# Patient Record
Sex: FEMALE | Race: WHITE
[De-identification: names, ages, dates, MRNs, and addresses within clinical notes are randomized per-mention and may not be internally consistent; named-entity substitution may affect disease eponyms.]

---

## 2019-02-13 ENCOUNTER — HOSPITAL ENCOUNTER (EMERGENCY)
Dept: HOSPITAL 74 - JER | Age: 24
Discharge: HOME | End: 2019-02-13
Payer: COMMERCIAL

## 2019-02-13 VITALS — BODY MASS INDEX: 22.1 KG/M2

## 2019-02-13 VITALS — DIASTOLIC BLOOD PRESSURE: 60 MMHG | TEMPERATURE: 98.6 F | SYSTOLIC BLOOD PRESSURE: 112 MMHG | HEART RATE: 88 BPM

## 2019-02-13 DIAGNOSIS — N83.201: ICD-10-CM

## 2019-02-13 DIAGNOSIS — R10.2: Primary | ICD-10-CM

## 2019-02-13 LAB
APPEARANCE UR: CLEAR
BILIRUB UR STRIP.AUTO-MCNC: NEGATIVE MG/DL
COLOR UR: YELLOW
KETONES UR QL STRIP: NEGATIVE
LEUKOCYTE ESTERASE UR QL STRIP.AUTO: NEGATIVE
NITRITE UR QL STRIP: NEGATIVE
PH UR: 6 [PH] (ref 5–8)
PROT UR QL STRIP: NEGATIVE
PROT UR QL STRIP: NEGATIVE
SP GR UR: 1.02 (ref 1.01–1.03)
UROBILINOGEN UR STRIP-MCNC: NEGATIVE MG/DL (ref 0.2–1)

## 2019-02-13 NOTE — PDOC
*Physical Exam





- Vital Signs


 Last Vital Signs











Temp Pulse Resp BP Pulse Ox


 


 98.6 F   88   18   112/60   100 


 


 02/13/19 18:44  02/13/19 18:44  02/13/19 18:44  02/13/19 18:44  02/13/19 18:44














ED Treatment Course





- ADDITIONAL ORDERS


Additional order review: 


 Laboratory  Results











  02/13/19





  18:52


 


Urine Color  Yellow


 


Urine Appearance  Clear


 


Urine pH  6.0


 


Ur Specific Gravity  1.019


 


Urine Protein  Negative


 


Urine Glucose (UA)  Negative


 


Urine Ketones  Negative


 


Urine Blood  Negative


 


Urine Nitrite  Negative


 


Urine Bilirubin  Negative


 


Urine Urobilinogen  Negative


 


Ur Leukocyte Esterase  Negative


 


Urine HCG, Qual  Negative














Medical Decision Making





- Medical Decision Making





02/13/19 19:30


Patient seen by the advanced practice provider under my direct supervision. 

Ancillary testing reviewed as necessary.


I agree with plan as outlined by the advanced practice provider.





*DC/Admit/Observation/Transfer


Diagnosis at time of Disposition: 


 Pelvic pain








- Discharge Dispostion


Disposition: HOME





- Referrals





- Patient Instructions





- Post Discharge Activity

## 2019-02-13 NOTE — PDOC
Rapid Medical Evaluation


Time Seen by Provider: 02/13/19 18:42


Medical Evaluation: 





02/13/19 18:42


I have performed a brief in-person evaluation of this patient.





The patient presents with a chief complaint of:pelvic pain x 1 month





Pertinent physical exam findings:NAD





I have ordered the following:U preg





The patient will proceed to the ED for further evaluation.





**Discharge Disposition





- Diagnosis


 Pelvic pain








- Referrals





- Patient Instructions





- Post Discharge Activity

## 2019-02-13 NOTE — PDOC
History of Present Illness





- General


Chief Complaint: Pain


Stated Complaint: LOWER ABD PAIN


Time Seen by Provider: 02/13/19 18:42


History Source: Patient





- History of Present Illness


Initial Comments: 





02/13/19 20:37


23 year old female c/o pelvic pain x 1 month last night with b/l pelvic pain. 

LMP: june 2018  last depo shot 8/2018. no urinary symptoms , vaginal discharge, 

no sti exposure. 





no pmhx





Past History





- Past Medical History


Allergies/Adverse Reactions: 


 Allergies











Allergy/AdvReac Type Severity Reaction Status Date / Time


 


No Known Allergies Allergy   Verified 02/13/19 18:46











COPD: No





- Suicide/Smoking/Psychosocial Hx


Smoking History: Never smoked


Information on smoking cessation initiated: No


Hx Alcohol Use: No


Drug/Substance Use Hx: No





**Review of Systems





- Review of Systems


Able to Perform ROS?: Yes


Is the patient limited English proficient: No


Constitutional: No: Symptoms Reported, See HPI, Chills, Diaphoresis, Fever, 

Loss of Appetite, Malaise, Night Sweats, Weakness, Weight Stable, Unintentional 

Wgt. Loss, Unexplained wgt Loss, Other


: Yes: Other (pelvic pain).  No: Symptoms Reported, See HPI, Burning, Dysuria

, Discharge, Frequency, Flank Pain, Hematuria, Incontinence, Pain, Urgency, 

Testicular Mass, Testicular Swelling, Lesions, Testicular Pain





*Physical Exam





- Vital Signs


 Last Vital Signs











Temp Pulse Resp BP Pulse Ox


 


 98.6 F   88   18   112/60   100 


 


 02/13/19 18:44  02/13/19 18:44  02/13/19 18:44  02/13/19 18:44  02/13/19 18:44














- Physical Exam


General Appearance: Yes: Appropriately Dressed


Respiratory/Chest: positive: Lungs Clear, Normal Breath Sounds


Cardiovascular: positive: Regular Rhythm, Regular Rate


Female Pelvic Exam: positive: normal external exam, adnexal tenderness (b/l)


Gastrointestinal/Abdominal: positive: Normal Bowel Sounds, Soft.  negative: 

Tender


Extremity: positive: Normal Capillary Refill, Normal Inspection, Normal Range 

of Motion


Integumentary: positive: Normal Color, Dry, Warm


Neurologic: positive: Fully Oriented, Alert, Normal Mood/Affect





Moderate Sedation





- Procedure Monitoring


Vital Signs: 


Procedure Monitoring Vital Signs











Temperature  98.6 F   02/13/19 18:44


 


Pulse Rate  88   02/13/19 18:44


 


Respiratory Rate  18   02/13/19 18:44


 


Blood Pressure  112/60   02/13/19 18:44


 


O2 Sat by Pulse Oximetry (%)  100   02/13/19 18:44











ED Treatment Course





- ADDITIONAL ORDERS


Additional order review: 


 Laboratory  Results











  02/13/19





  18:52


 


Urine Color  Yellow


 


Urine Appearance  Clear


 


Urine pH  6.0


 


Ur Specific Gravity  1.019


 


Urine Protein  Negative


 


Urine Glucose (UA)  Negative


 


Urine Ketones  Negative


 


Urine Blood  Negative


 


Urine Nitrite  Negative


 


Urine Bilirubin  Negative


 


Urine Urobilinogen  Negative


 


Ur Leukocyte Esterase  Negative


 


Urine HCG, Qual  Negative














*DC/Admit/Observation/Transfer


Diagnosis at time of Disposition: 


 Pelvic pain





Ovarian cyst


Qualifiers:


 Laterality: bilateral Qualified Code(s): N83.201 - Unspecified ovarian cyst, 

right side; N83.202 - Unspecified ovarian cyst, left side








- Discharge Dispostion


Disposition: HOME





- Referrals


Referrals: 


Sandra Simeon MD [Staff Physician] - 





- Patient Instructions


Printed Discharge Instructions:  Ovarian Cyst


Additional Instructions: 


take ibuprofen every 6 hours AS NEEDED  for pain





follow up with a gynecologist as soon as possible. 








Additional Instructions:


* Please call your personal physician to report your Emergency Department visit 

and to report your progress, if any.


* If there is no improvement in symptoms in 2 days call your physician.


* Return to the Emergency Department for any worsening symptoms.








- Post Discharge Activity


Forms/Work/School Notes:  Back to Work

## 2019-06-19 ENCOUNTER — HOSPITAL ENCOUNTER (EMERGENCY)
Dept: HOSPITAL 74 - JER | Age: 24
Discharge: HOME | End: 2019-06-19
Payer: SELF-PAY

## 2019-06-19 VITALS — BODY MASS INDEX: 21.2 KG/M2

## 2019-06-19 VITALS — HEART RATE: 79 BPM | SYSTOLIC BLOOD PRESSURE: 117 MMHG | DIASTOLIC BLOOD PRESSURE: 66 MMHG | TEMPERATURE: 98.4 F

## 2019-06-19 DIAGNOSIS — R10.2: Primary | ICD-10-CM

## 2019-06-19 LAB
ALBUMIN SERPL-MCNC: 4.3 G/DL (ref 3.4–5)
ALP SERPL-CCNC: 63 U/L (ref 45–117)
ALT SERPL-CCNC: 16 U/L (ref 13–61)
ANION GAP SERPL CALC-SCNC: 8 MMOL/L (ref 8–16)
APPEARANCE UR: CLEAR
AST SERPL-CCNC: 11 U/L (ref 15–37)
BASOPHILS # BLD: 0.6 % (ref 0–2)
BILIRUB SERPL-MCNC: 0.4 MG/DL (ref 0.2–1)
BILIRUB UR STRIP.AUTO-MCNC: NEGATIVE MG/DL
BUN SERPL-MCNC: 8 MG/DL (ref 7–18)
CALCIUM SERPL-MCNC: 9.2 MG/DL (ref 8.5–10.1)
CHLORIDE SERPL-SCNC: 105 MMOL/L (ref 98–107)
CO2 SERPL-SCNC: 27 MMOL/L (ref 21–32)
COLOR UR: YELLOW
CREAT SERPL-MCNC: 0.6 MG/DL (ref 0.55–1.3)
DEPRECATED RDW RBC AUTO: 12.6 % (ref 11.6–15.6)
EOSINOPHIL # BLD: 3.8 % (ref 0–4.5)
GLUCOSE SERPL-MCNC: 79 MG/DL (ref 74–106)
HCT VFR BLD CALC: 38 % (ref 32.4–45.2)
HGB BLD-MCNC: 12.7 GM/DL (ref 10.7–15.3)
KETONES UR QL STRIP: NEGATIVE
LEUKOCYTE ESTERASE UR QL STRIP.AUTO: NEGATIVE
LYMPHOCYTES # BLD: 20.6 % (ref 8–40)
MCH RBC QN AUTO: 31.9 PG (ref 25.7–33.7)
MCHC RBC AUTO-ENTMCNC: 33.4 G/DL (ref 32–36)
MCV RBC: 95.6 FL (ref 80–96)
MONOCYTES # BLD AUTO: 6.9 % (ref 3.8–10.2)
NEUTROPHILS # BLD: 68.1 % (ref 42.8–82.8)
NITRITE UR QL STRIP: NEGATIVE
PH UR: 6.5 [PH] (ref 5–8)
PLATELET # BLD AUTO: 256 K/MM3 (ref 134–434)
PLATELET BLD QL SMEAR: NORMAL
PMV BLD: 10.4 FL (ref 7.5–11.1)
POTASSIUM SERPLBLD-SCNC: 3.9 MMOL/L (ref 3.5–5.1)
PROT SERPL-MCNC: 7.5 G/DL (ref 6.4–8.2)
PROT UR QL STRIP: NEGATIVE
PROT UR QL STRIP: NEGATIVE
RBC # BLD AUTO: 3.98 M/MM3 (ref 3.6–5.2)
SODIUM SERPL-SCNC: 140 MMOL/L (ref 136–145)
SP GR UR: 1.01 (ref 1.01–1.03)
UROBILINOGEN UR STRIP-MCNC: 0.2 MG/DL (ref 0.2–1)
WBC # BLD AUTO: 10.9 K/MM3 (ref 4–10)

## 2019-06-19 NOTE — PDOC
History of Present Illness





- General


Chief Complaint: Pain, Acute


Stated Complaint: ABD PAIN


History Source: Patient





- History of Present Illness


Timing/Duration: reports: other


Abdominal Pain Onset Location: reports: suprapubic





Past History





- Past Medical History


Allergies/Adverse Reactions: 


 Allergies











Allergy/AdvReac Type Severity Reaction Status Date / Time


 


No Known Allergies Allergy   Verified 02/13/19 18:46











COPD: No





- Immunization History


Immunization Up to Date: No





- Suicide/Smoking/Psychosocial Hx


Smoking History: Never smoked


Have you smoked in the past 12 months: No


Information on smoking cessation initiated: No


Hx Alcohol Use: No


Drug/Substance Use Hx: No





**Review of Systems





- Review of Systems


Constitutional: No: Chills, Fever


ABD/GI: Yes: Abdominal cramping.  No: Blood Streaked Bowels, Constipated, 

Diarrhea, Nausea, Rectal Bleeding, Vomiting


: No: Burning, Dysuria, Flank Pain, Hematuria





*Physical Exam





- Vital Signs


 Last Vital Signs











Temp Pulse Resp BP Pulse Ox


 


 98.4 F   79   18   117/66   99 


 


 06/19/19 13:03  06/19/19 13:03  06/19/19 13:03  06/19/19 13:03  06/19/19 13:03














- Physical Exam


General Appearance: Yes: Appropriately Dressed.  No: Apparent Distress


HEENT: positive: Normal Voice


Neck: positive: Supple


Respiratory/Chest: negative: Respiratory Distress


Female Pelvic Exam: positive: cervical os closed, normal adnexa.  negative: CMT

, discharge, adnexal tenderness, vaginal bleeding


Gastrointestinal/Abdominal: positive: Normal Bowel Sounds, Tender (to 

suprapubic diffusely, no sig ttp to RLQ), Soft.  negative: Distended, Guarding, 

Rebound


Musculoskeletal: negative: CVA Tenderness


Integumentary: positive: Dry, Warm


Neurologic: positive: Fully Oriented, Alert, Normal Mood/Affect





ED Treatment Course





- LABORATORY


CBC & Chemistry Diagram: 


 06/19/19 14:00





 06/19/19 15:18





Medical Decision Making





- Medical Decision Making





06/19/19 13:42


25 yo F, no sig hx, p/w lower abd pain x 2-3 days, sharp, worse at nights, 

prevents her from sleeping.  No dysuria, hematuria, vaginal discharge, f/c or 

change in BM. No menses since came off depo 8/18 and only "spots" on and off. 

No new sexual partners. No h/o STDs





See exam





Pelvic pain


R/o preg vs torsion vs appy, less likely uti/pyelo, unlikely PID as pelvic exam 

wnl


-pain control


-labs


-US


-?CT











06/19/19 17:35





Labs/US w/ no findings to explain pain.  States pain since improved with meds.  

I explained to patient that based on the workup, that we do not know the source 

of her pain, but that we can do a CAT scan today to evaluate her appendix.  

Patient states she is feeling better and declines CT at this time, states she 

would rather go home and come back if her pain returns. Given no sig ttp to RLQ 

on exam and normal labs, shared decision making was made for pt to return to ED 

as needed











*DC/Admit/Observation/Transfer


Diagnosis at time of Disposition: 


 Pelvic pain








- Discharge Dispostion


Disposition: HOME


Condition at time of disposition: Improved





- Referrals





- Patient Instructions


Printed Discharge Instructions:  DI for Pelvic Pain


Additional Instructions: 


The source of your pain is unclear at this time as your labs, urine and 

ultrasound were all normal.


If your pain returns and is especially localized to your right lower abdomen, 

please return to ER for a CAT scan to evaluate for possible appendicitis





- Post Discharge Activity

## 2019-07-14 ENCOUNTER — HOSPITAL ENCOUNTER (EMERGENCY)
Dept: HOSPITAL 74 - JER | Age: 24
Discharge: LEFT BEFORE DISCHARGE | End: 2019-07-14
Payer: COMMERCIAL

## 2019-07-14 VITALS — SYSTOLIC BLOOD PRESSURE: 119 MMHG | HEART RATE: 93 BPM | DIASTOLIC BLOOD PRESSURE: 67 MMHG | TEMPERATURE: 98.7 F

## 2019-07-14 VITALS — BODY MASS INDEX: 20.8 KG/M2

## 2019-07-14 DIAGNOSIS — R10.2: Primary | ICD-10-CM

## 2019-07-14 LAB
APPEARANCE UR: CLEAR
BILIRUB UR STRIP.AUTO-MCNC: NEGATIVE MG/DL
COLOR UR: YELLOW
KETONES UR QL STRIP: NEGATIVE
LEUKOCYTE ESTERASE UR QL STRIP.AUTO: NEGATIVE
NITRITE UR QL STRIP: NEGATIVE
PH UR: >= 9 [PH] (ref 5–8)
PROT UR QL STRIP: NEGATIVE
PROT UR QL STRIP: NEGATIVE
SP GR UR: 1.03 (ref 1.01–1.03)
UROBILINOGEN UR STRIP-MCNC: 1 MG/DL (ref 0.2–1)

## 2019-07-14 NOTE — PDOC
History of Present Illness





- General


Chief Complaint: Pain


Stated Complaint: PELVIC PAIN


Time Seen by Provider: 07/14/19 18:24


History Source: Patient


Exam Limitations: No Limitations





- History of Present Illness


Initial Comments: 





07/14/19 18:45


Patient is a 24 year old female with no pmhx c/o pelvic pain x 3 days.  Paitent 

states the pain is 8/10, pressure, assoc/w nausea, vomiting intermittently with 

no aggravating or alleviating factors.  States she had been drinking water for 

her symptoms but has not taken any meds.  Denies dysuria, or hematuria.  No h/o 

STD.  States prior episode of this pain 4 months ago and was diagnoses with 

cyst.  Review of the record shows patient was her for these symtpoms 3 months 

ago work up neg.  lmp 6/23/19.








GYN:  Dr. Horton


PMHX:  as above


PSOCHX:  neg etoh, cig, drug








GENERAL/CONSTITUTIONAL: No fever or chills. No weakness. No weight change.


HEAD, EYES, EARS, NOSE AND THROAT: No change in vision. No ear pain or 

discharge. No sore throat.


CARDIOVASCULAR: No chest pain or shortness of breath.


RESPIRATORY: No cough, wheezing, or hemoptysis.


GASTROINTESTINAL: No nausea, vomiting, diarrhea or constipation. No rectal 

bleeding.


GENITOURINARY: No dysuria, frequency, or change in urination.


MUSCULOSKELETAL: No joint or muscle swelling or pain. No neck or back pain.


SKIN AND BREASTS: No rash or easy bruising.


NEUROLOGIC: No headache, vertigo, loss of consciousness, or loss of sensation.


PSYCHIATRIC: No depression or anxiety.


ENDOCRINE: No increased thirst. No abnormal weight change.


HEMATOLOGIC/LYMPHATIC: No anemia, easy bleeding, or history of blood clots.


ALLERGIC/IMMUNOLOGIC: No hives or skin allergy. No latex allergy.





GENERAL: The patient is awake, alert, and fully oriented, in no acute distress.


HEAD: Normal with no signs of trauma.


EYES: Pupils equal, round and reactive to light, extraocular movements intact, 

sclera anicteric, conjunctiva clear.


ENT: Ears normal, nares patent, oropharynx clear without exudates.  Moist 

mucous membranes.


NECK: Normal range of motion, supple without lymphadenopathy, JVD, or masses.


LUNGS: Breath sounds equal, clear to auscultation bilaterally.  No wheezes, and 

no crackles.


HEART: Regular rate and rhythm, normal S1 and S2 without murmur, rub.


ABDOMEN: Soft, nontender, normoactive bowel sounds.  No guarding, no rebound.  

No masses.


PELVIC:  not done patient walked out


EXTREMITIES: Normal range of motion, no edema.  No clubbing or cyanosis. No 

cords, erythema, or tenderness.


NEUROLOGICAL: Cranial nerves II through XII grossly intact.  Normal speech, 

normal gait.


PSYCH: Normal mood, normal affect.











Past History





- Past Medical History


Allergies/Adverse Reactions: 


 Allergies











Allergy/AdvReac Type Severity Reaction Status Date / Time


 


No Known Allergies Allergy   Verified 07/14/19 18:03











COPD: No





- Immunization History


Immunization Up to Date: No





- Suicide/Smoking/Psychosocial Hx


Smoking History: Never smoked


Have you smoked in the past 12 months: No


Hx Alcohol Use: No


Drug/Substance Use Hx: No





*Physical Exam





- Vital Signs


 Last Vital Signs











Temp Pulse Resp BP Pulse Ox


 


 98.7 F   93 H  18   119/67   99 


 


 07/14/19 18:01  07/14/19 18:01  07/14/19 18:01  07/14/19 18:01  07/14/19 18:01














Medical Decision Making





- Medical Decision Making





07/14/19 18:45


Patient is a 24 year old female with no pmhx c/o pelvic pain x 3 days.  Paitent 

states the pain is 8/10, pressure, assoc/w nausea, vomiting intermittently with 

no aggravating or alleviating factors.  States she had been drinking water for 

her symptoms but has not taken any meds.  Denies dysuria, or hematuria.  No h/o 

STD.  States prior episode of this pain 4 months ago and was diagnoses with 

cyst.  Review of the record shows patient was her for these symtpoms 3 months 

ago work up neg.  lmp 6/23/19.





UA/cult/preg


reassess





 


07/14/19 21:27


Urine pregnancy test negative.





Look for patient in the vertical area to do pelvic exam and disposition, 

assumed to have eloped





*DC/Admit/Observation/Transfer


Diagnosis at time of Disposition: 


 Pelvic pain








- Discharge Dispostion


Disposition: ELOPED


Condition at time of disposition: Stable





- Referrals





- Patient Instructions





- Post Discharge Activity

## 2019-09-05 ENCOUNTER — HOSPITAL ENCOUNTER (EMERGENCY)
Dept: HOSPITAL 74 - JER | Age: 24
LOS: 1 days | Discharge: HOME | End: 2019-09-06
Payer: SELF-PAY

## 2019-09-05 VITALS — SYSTOLIC BLOOD PRESSURE: 112 MMHG | TEMPERATURE: 97.7 F | DIASTOLIC BLOOD PRESSURE: 73 MMHG | HEART RATE: 91 BPM

## 2019-09-05 VITALS — BODY MASS INDEX: 22.6 KG/M2

## 2019-09-05 DIAGNOSIS — R10.2: ICD-10-CM

## 2019-09-05 DIAGNOSIS — Z3A.01: ICD-10-CM

## 2019-09-05 DIAGNOSIS — O26.891: Primary | ICD-10-CM

## 2019-09-05 LAB
ANION GAP SERPL CALC-SCNC: 6 MMOL/L (ref 8–16)
APPEARANCE UR: CLEAR
BASOPHILS # BLD: 0.4 % (ref 0–2)
BILIRUB UR STRIP.AUTO-MCNC: NEGATIVE MG/DL
BUN SERPL-MCNC: 13.9 MG/DL (ref 7–18)
CALCIUM SERPL-MCNC: 9.1 MG/DL (ref 8.5–10.1)
CHLORIDE SERPL-SCNC: 106 MMOL/L (ref 98–107)
CO2 SERPL-SCNC: 27 MMOL/L (ref 21–32)
COLOR UR: YELLOW
CREAT SERPL-MCNC: 0.6 MG/DL (ref 0.55–1.3)
DEPRECATED RDW RBC AUTO: 12.5 % (ref 11.6–15.6)
EOSINOPHIL # BLD: 3.3 % (ref 0–4.5)
GLUCOSE SERPL-MCNC: 87 MG/DL (ref 74–106)
HCT VFR BLD CALC: 36.8 % (ref 32.4–45.2)
HGB BLD-MCNC: 11.9 GM/DL (ref 10.7–15.3)
KETONES UR QL STRIP: NEGATIVE
LEUKOCYTE ESTERASE UR QL STRIP.AUTO: NEGATIVE
LYMPHOCYTES # BLD: 24.1 % (ref 8–40)
MCH RBC QN AUTO: 31.4 PG (ref 25.7–33.7)
MCHC RBC AUTO-ENTMCNC: 32.3 G/DL (ref 32–36)
MCV RBC: 97.1 FL (ref 80–96)
MONOCYTES # BLD AUTO: 7.1 % (ref 3.8–10.2)
NEUTROPHILS # BLD: 65.1 % (ref 42.8–82.8)
NITRITE UR QL STRIP: NEGATIVE
PH UR: 6 [PH] (ref 5–8)
PLATELET # BLD AUTO: 265 K/MM3 (ref 134–434)
PMV BLD: 10.2 FL (ref 7.5–11.1)
POTASSIUM SERPLBLD-SCNC: 3.6 MMOL/L (ref 3.5–5.1)
PROT UR QL STRIP: NEGATIVE
PROT UR QL STRIP: NEGATIVE
RBC # BLD AUTO: 3.79 M/MM3 (ref 3.6–5.2)
SODIUM SERPL-SCNC: 139 MMOL/L (ref 136–145)
SP GR UR: 1.02 (ref 1.01–1.03)
UROBILINOGEN UR STRIP-MCNC: 0.2 MG/DL (ref 0.2–1)
WBC # BLD AUTO: 12.6 K/MM3 (ref 4–10)

## 2019-09-05 NOTE — PDOC
Rapid Medical Evaluation


Time Seen by Provider: 19 21:36


Medical Evaluation: 


 Allergies











Allergy/AdvReac Type Severity Reaction Status Date / Time


 


No Known Allergies Allergy   Verified 19 18:03











19 21:36


I have performed a brief in-person evaluation of this patient.





The patient presents with a chief complaint of: lower abdominal pain  LMP ?







Pertinent physical exam findings: deferred





I have ordered the following: PVB w/u





The patient will proceed to the ED for further evaluation.





**Discharge Disposition





- Diagnosis


 Pelvic pain








- Referrals





- Patient Instructions





- Post Discharge Activity

## 2019-09-05 NOTE — PDOC
Documentation entered by Regis Aviles SCRIBE, acting as scribe for Diamond Michaels DO.








Diamond Michaels DO:  This documentation has been prepared by the Traci ochoa Xhesika, SCRIBE, under my direction and personally reviewed by me in its 

entirety.  I confirm that the documentation accurately reflects all work, 

treatment, procedures, and medical decision making performed by me.  





Attending Attestation





- Resident


Resident Name: ElvisGabriela





- ED Attending Attestation


I have performed the following: I have examined & evaluated the patient, The 

case was reviewed & discussed with the resident, I agree w/resident's findings 

& plan, Exceptions are as noted





- HPI


HPI: 





19 23:32


The patient is a 24 year old female, , currently 6 weeks pregnant, with no 

significant PMH of who presents to the emergency department for abdominal 

cramping. Patient states she took 5 pregnancy tests last Friday that were all 

positive. Patient denies any bleeding or vaginal discharge. Patient states her 

LMP was end of July. 





The patient denies chest pain, shortness of breath, headache and dizziness. 

Denies fever, chills, cough, nausea, vomiting, diarrhea and constipation. 

Denies dysuria, frequency, urgency and hematuria.





Allergies: NKDA








- Physicial Exam


PE: 





19 23:25


Gen: aaox3, nad


heart: +s1s2 reg


lungs: cta b/l


abd: soft, nt/nd +bs


ext: no c/c/e





- Medical Decision Making





19 23:23








I, Dr. Diamond Michaels DO, attest that this document has been prepared under 

my direction and personally reviewed by me in its entirety.   I further attest, 

that it accurately reflects all work, treatment, procedures and medical decision

-making performed by me.  


19 23:23


a/p: 25yo  with + preg test last friday


-last menstrual cycle was in july - however took preg test last week at work


-cramping in abd


-no bleeding, no vaginal discharge, no dysuria


-pt denies nausea, no vomiting


-labs sent were reviewed


-beta 300


-pending tvus - concern for cramping for threatened ab vs early preg


-no matter tvus results, pt will need repeat beta in 2 days


19 01:02


pt is O+





19 01:05


thickened endometrial lining on ultrasound


pt will need repeat beta hcg in 2 days


stable for dc to home

## 2019-09-05 NOTE — PDOC
History of Present Illness





- General


Chief Complaint: Pain


Stated Complaint: ABD PAIN/6WKS PREG


Time Seen by Provider: 09/05/19 21:36





- History of Present Illness


Initial Comments: 


Carlton Melgar is an otherwise healthy 25yo woman, currently 5wks pregnant 

by LMP (7/31) who presents with suprapubic abdominal pain since yesterday. She 

reports that the pain is intermittent and sharp. She has not noticed any 

association with position, movement, eating, or bowel movements. She denies any 

vaginal bleeding, abnormal vaginal discharge, or dysuria. She denies fevers, 

chills, nausea, vomiting, or any other medical problems. She went to see Dr Simeon after her positive home pregnancy test last week and was told that 

everything was fine. She was scheduled for an US in 2 weeks and has been taking 

prescribed prenatal vitamins at home. 


She presented to the ED because she was concerned that there was something 

wrong with her pregnancy. 








Past History





- Past Medical History


Allergies/Adverse Reactions: 


 Allergies











Allergy/AdvReac Type Severity Reaction Status Date / Time


 


No Known Allergies Allergy   Verified 09/05/19 21:36











COPD: No





- Immunization History


Immunization Up to Date: No





- Suicide/Smoking/Psychosocial Hx


Smoking History: Never smoked


Have you smoked in the past 12 months: No


Hx Alcohol Use: No


Drug/Substance Use Hx: No





**Review of Systems





- Review of Systems


Comments:: 


General: No fevers, no chills, no weight or appetite change, no malaise


HEENT: No changes in vision, no changes in hearing, no congestion, no sore 

throat


CV: No chest pain, no palpitations, no LE edema


Pulm: No SOB, no cough, no wheezing


GI: No nausea or vomiting, no change in bowel habits, no melena


: No frequency, no urgency, no dysuria


Musc: No back pain, no joint swelling, no recent injury


Skin: No rash, no lesions, no erythema


Endo: No excessive thirst, no heat/cold intolerance


Heme: No unusual bruising or bleeding, no swollen glands


Neuro: No syncope, no numbness/tingling, no focal weakness


Vasc: No claudication


Psych: No recent change in mood, no SI or HI








*Physical Exam





- Vital Signs


 Last Vital Signs











Temp Pulse Resp BP Pulse Ox


 


 97.7 F   91 H  18   112/73   100 


 


 09/05/19 21:37  09/05/19 21:37  09/05/19 21:37  09/05/19 21:37  09/05/19 21:37














- Physical Exam


Comments: 


General: Comfortable, no acute distress


HEENT: PERRL, EOMI, MMM, voice normal, normal neck ROM, no LAD


Cards: RRR, no murmur appreciated


Pulm: Comfortable on room air, clear to auscultation bilaterally


Abd: Soft, nontender, nondistended


: Normal external genitalia, 2x small pimple-like lesions on right labia. 

Thin white discharge in vault, no bleeding or lesions appreciated. Os closed. 

Discomfort with exam but no CMT or adnexal TTP. 


Ext: Atraumatic. No LE edema. ROM intact. WWP


Skin: Normal color, no rashes or lesions


Neuro: A&Ox3, CN grossly intact, normal speech, motor/sensory grossly intact 

and symmetric


Psych: Mood appropriate to situation 











ED Treatment Course





- LABORATORY


CBC & Chemistry Diagram: 


 09/05/19 21:54





 09/05/19 21:54





- ADDITIONAL ORDERS


Additional order review: 


 Laboratory  Results











  09/05/19 09/05/19





  21:54 21:54


 


Sodium   139


 


Potassium   3.6


 


Chloride   106


 


Carbon Dioxide   27


 


Anion Gap   6 L


 


BUN   13.9


 


Creatinine   0.6


 


Est GFR (CKD-EPI)AfAm   147.86


 


Est GFR (CKD-EPI)NonAf   127.58


 


Random Glucose   87


 


Calcium   9.1


 


Beta HCG, Quant   387.3


 


Urine Color  Yellow 


 


Urine Appearance  Clear 


 


Urine pH  6.0  D 


 


Ur Specific Gravity  1.017 


 


Urine Protein  Negative 


 


Urine Glucose (UA)  Negative 


 


Urine Ketones  Negative 


 


Urine Blood  Negative 


 


Urine Nitrite  Negative 


 


Urine Bilirubin  Negative 


 


Urine Urobilinogen  0.2 


 


Ur Leukocyte Esterase  Negative 








 











  09/05/19





  21:54


 


RBC  3.79


 


MCV  97.1 H


 


MCHC  32.3


 


RDW  12.5


 


MPV  10.2


 


Neutrophils %  65.1


 


Lymphocytes %  24.1


 


Monocytes %  7.1


 


Eosinophils %  3.3


 


Basophils %  0.4














Medical Decision Making





- Medical Decision Making





09/05/19 22:59


Carlton Melgar is an otherwise healthy 25yo woman, currently 5wks pregnant 

by LMP (7/31) who presents with suprapubic abdominal pain since yesterday. She 

denies any vaginal bleeding or discharge. 


- Most likely normal pregnancy, possible ectopic or early miscarriage


- CBC, BMP, T&S, bHCG, UA sent from [facility]


- Labs pending


- Benign abdominal exam, no vaginal bleeding


- TVUS to be completed





09/05/19 23:45


- Labs completed. Notable for bHCG 387, could be very early pregnancy vs failed 

pregnancy. 


- UA negative


- Pt at US


- Will likely need repeat bHCG in 2 days





09/06/19 00:15


- US reviewed in ED. Shows thickened endometrium but no gestational sac or 

fetal pole


- Will most likely d/c home with return for repeat HCG in 2 days pending formal 

US read





09/06/19 01:04


- Official US read agrees with ED read


- Pt advised to return to the ED or her obstetrician in 48 hours for repeat bHCG


- Has care established with Dr Simeon, advised to call tomorrow for follow up


- Discussion regarding home care and return precautions. Pt understands and 

agrees.





Discussed with Dr Xenai Leal


PGY2





*DC/Admit/Observation/Transfer


Diagnosis at time of Disposition: 


 Pelvic pain, Early stage of pregnancy








- Discharge Dispostion


Disposition: HOME


Condition at time of disposition: Stable


Decision to Admit order: No





- Referrals


Referrals: 


Sandra Simeon MD [Staff Physician] - 





- Patient Instructions


Printed Discharge Instructions:  DI for Abdominal Pain -- Early Pregnancy, DI 

for Pregnancy -- Discomforts and Remedies


Additional Instructions: 


Discharge Instructions:


You were seen in the emergency department for abdominal pain in early 

pregnancy. Your pregnancy hormone was 387, which is consistent with a pregnancy 

of about 2-3 weeks. 


You will need to return to the emergency department or your obstetrician in 48 

hours to recheck your pregnancy hormone to make sure it is increasing 

appropriately. 


You may take acetaminophen (Tylenol) 650-1000mg every 6-8 hours as needed for 

pain or discomfort.





Call Dr Simeon tomorrow to set up follow up. 





Seek immediate medical care if you have worsening symptoms, severe abdominal 

pain, vaginal bleeding, you become lightheaded, or you have any other medical 

emergency. 





- Post Discharge Activity

## 2019-09-07 ENCOUNTER — HOSPITAL ENCOUNTER (EMERGENCY)
Dept: HOSPITAL 74 - JERFT | Age: 24
Discharge: HOME | End: 2019-09-07
Payer: COMMERCIAL

## 2019-09-07 VITALS — TEMPERATURE: 98.4 F | SYSTOLIC BLOOD PRESSURE: 105 MMHG | DIASTOLIC BLOOD PRESSURE: 69 MMHG | HEART RATE: 97 BPM

## 2019-09-07 VITALS — BODY MASS INDEX: 22.6 KG/M2

## 2019-09-07 DIAGNOSIS — O26.891: Primary | ICD-10-CM

## 2019-09-07 DIAGNOSIS — Z3A.01: ICD-10-CM

## 2019-09-07 NOTE — PDOC
History of Present Illness





- General


Chief Complaint: Revisit, Lab Variance


Stated Complaint: FOLLOW UP


Time Seen by Provider: 19 12:07


History Source: Patient


Exam Limitations: No Limitations





Past History





- Past Medical History


Allergies/Adverse Reactions: 


 Allergies











Allergy/AdvReac Type Severity Reaction Status Date / Time


 


No Known Allergies Allergy   Verified 19 12:05











COPD: No





- Immunization History


Immunization Up to Date: No





- Suicide/Smoking/Psychosocial Hx


Smoking History: Never smoked


Have you smoked in the past 12 months: No


Hx Alcohol Use: No


Drug/Substance Use Hx: No





*Physical Exam





- Vital Signs


 Last Vital Signs











Temp Pulse Resp BP Pulse Ox


 


 98.4 F   97 H  18   105/69   99 


 


 19 12:03  19 12:03  19 12:03  19 12:03  19 12:03














- Physical Exam


General Appearance: No: Apparent Distress


Respiratory/Chest: positive: Lungs Clear, Normal Breath Sounds.  negative: 

Respiratory Distress


Cardiovascular: positive: Regular Rhythm, Regular Rate, S1, S2.  negative: 

Murmur


Gastrointestinal/Abdominal: positive: Normal Bowel Sounds, Soft.  negative: 

Tender, Distended, Guarding, Rebound


Neurologic: positive: Alert





Medical Decision Making





- Medical Decision Making








25 y/o F , LNMP , currently 6 weeks pregnant, presents for repeat Bhcg. 

Patient was seen 2 days ago for mild lower abdominal pain, had Bhcg of 387, 

with no definite evidence of IUP noted on US. Patient mentions having





 


19 12:28





 Laboratory Results - last 24 hr











  19





  12:00


 


Beta HCG, Quant  847.9








Beta hcg has increased appropriately 


Repeat US shows possible early intrauterine gestational sac; no evidence of 

ectopic pregnancy


Likely early pregnancy


Patient had to leave prior to getting results of US (she had a house closing to 

do)


Patient was called back and informed of results


Patient is getting repeat US in 2 days and is seeing her GYN the next day


Advised to return if having severe abdominal pain or vaginal bleeding





19 14:06








*DC/Admit/Observation/Transfer


Diagnosis at time of Disposition: 


 Early stage of pregnancy








- Discharge Dispostion


Disposition: HOME


Condition at time of disposition: Stable


Decision to Admit order: No





- Referrals





- Patient Instructions





- Post Discharge Activity